# Patient Record
Sex: MALE | URBAN - METROPOLITAN AREA
[De-identification: names, ages, dates, MRNs, and addresses within clinical notes are randomized per-mention and may not be internally consistent; named-entity substitution may affect disease eponyms.]

---

## 2020-10-02 ENCOUNTER — OFFICE VISIT (OUTPATIENT)
Dept: URBAN - METROPOLITAN AREA CLINIC 88 | Facility: CLINIC | Age: 45
End: 2020-10-02

## 2020-10-02 DIAGNOSIS — H35.711 CENTRAL SEROUS CHORIORETINOPATHY, RIGHT EYE: Primary | Chronic | ICD-10-CM

## 2020-10-02 PROCEDURE — 99202 OFFICE O/P NEW SF 15 MIN: CPT | Performed by: OPTOMETRIST

## 2020-10-02 PROCEDURE — 92133 CPTRZD OPH DX IMG PST SGM ON: CPT | Performed by: OPTOMETRIST

## 2020-10-02 ASSESSMENT — KERATOMETRY
OD: 42.38
OS: 42.38

## 2020-10-02 ASSESSMENT — INTRAOCULAR PRESSURE
OD: 16
OS: 16

## 2020-10-02 NOTE — IMPRESSION/PLAN
Impression: Central serous chorioretinopathy, right eye: H35.711. Plan: Discussed status in detail with patient. OCT Macula performed today showing small cyst under photoreceptors just off the foveal axis. These findings were in line with what the patient described. Amsler grid given to patient for monitoring. Patient advised to follow-up with Retinal specialist if symptoms worsen.